# Patient Record
Sex: FEMALE | URBAN - METROPOLITAN AREA
[De-identification: names, ages, dates, MRNs, and addresses within clinical notes are randomized per-mention and may not be internally consistent; named-entity substitution may affect disease eponyms.]

---

## 2022-02-18 ENCOUNTER — APPOINTMENT (RX ONLY)
Dept: URBAN - METROPOLITAN AREA CLINIC 335 | Facility: CLINIC | Age: 52
Setting detail: DERMATOLOGY
End: 2022-02-18

## 2022-02-18 DIAGNOSIS — L64.8 OTHER ANDROGENIC ALOPECIA: ICD-10-CM | Status: INADEQUATELY CONTROLLED

## 2022-02-18 DIAGNOSIS — L98.8 OTHER SPECIFIED DISORDERS OF THE SKIN AND SUBCUTANEOUS TISSUE: ICD-10-CM

## 2022-02-18 DIAGNOSIS — L71.8 OTHER ROSACEA: ICD-10-CM | Status: INADEQUATELY CONTROLLED

## 2022-02-18 PROCEDURE — ? PRESCRIPTION

## 2022-02-18 PROCEDURE — ? COUNSELING

## 2022-02-18 PROCEDURE — ? BOTOX

## 2022-02-18 PROCEDURE — ? ORDER TESTS

## 2022-02-18 PROCEDURE — ? ADDITIONAL NOTES

## 2022-02-18 PROCEDURE — 99204 OFFICE O/P NEW MOD 45 MIN: CPT

## 2022-02-18 PROCEDURE — ? OBSERVATION

## 2022-02-18 RX ORDER — HYDROCORTISONE 25 MG/G
CREAM TOPICAL BID
Qty: 30 | Refills: 3 | Status: ERX | COMMUNITY
Start: 2022-02-18

## 2022-02-18 RX ORDER — METRONIDAZOLE 7.5 MG/G
CREAM TOPICAL BID
Qty: 45 | Refills: 3 | Status: ERX | COMMUNITY
Start: 2022-02-18

## 2022-02-18 RX ADMIN — HYDROCORTISONE: 25 CREAM TOPICAL at 00:00

## 2022-02-18 RX ADMIN — METRONIDAZOLE: 7.5 CREAM TOPICAL at 00:00

## 2022-02-18 ASSESSMENT — LOCATION DETAILED DESCRIPTION DERM
LOCATION DETAILED: LEFT MEDIAL FRONTAL SCALP
LOCATION DETAILED: LEFT CENTRAL ZYGOMA
LOCATION DETAILED: SUPERIOR MID FOREHEAD
LOCATION DETAILED: RIGHT INFERIOR CENTRAL MALAR CHEEK

## 2022-02-18 ASSESSMENT — LOCATION SIMPLE DESCRIPTION DERM
LOCATION SIMPLE: SUPERIOR FOREHEAD
LOCATION SIMPLE: RIGHT CHEEK
LOCATION SIMPLE: LEFT SCALP
LOCATION SIMPLE: LEFT ZYGOMA

## 2022-02-18 ASSESSMENT — LOCATION ZONE DERM
LOCATION ZONE: FACE
LOCATION ZONE: SCALP

## 2022-02-18 NOTE — HPI: HAIR LOSS
How Did The Hair Loss Occur?: gradual in onset
Additional History: Patient also reports rosacea on face that is not currently managed.

## 2022-02-18 NOTE — PROCEDURE: BOTOX
Price (Use Numbers Only, No Special Characters Or $): 812 Price (Use Numbers Only, No Special Characters Or $): 767

## 2022-02-18 NOTE — PROCEDURE: ORDER TESTS
Expected Date Of Service: 02/18/2022
Bill For Surgical Tray: no
Billing Type: Third-Party Bill
Performing Laboratory: 0

## 2022-03-09 ENCOUNTER — APPOINTMENT (RX ONLY)
Dept: URBAN - METROPOLITAN AREA CLINIC 328 | Facility: CLINIC | Age: 52
Setting detail: DERMATOLOGY
End: 2022-03-09

## 2022-03-09 DIAGNOSIS — L64.8 OTHER ANDROGENIC ALOPECIA: ICD-10-CM

## 2022-03-09 DIAGNOSIS — Z41.9 ENCOUNTER FOR PROCEDURE FOR PURPOSES OTHER THAN REMEDYING HEALTH STATE, UNSPECIFIED: ICD-10-CM

## 2022-03-09 PROCEDURE — ? IN-HOUSE DISPENSING PHARMACY

## 2022-03-09 PROCEDURE — ? OBSERVATION

## 2022-03-09 PROCEDURE — ? COUNSELING

## 2022-03-09 PROCEDURE — ? BOTOX

## 2022-03-09 PROCEDURE — 99213 OFFICE O/P EST LOW 20 MIN: CPT

## 2022-03-09 ASSESSMENT — LOCATION ZONE DERM
LOCATION ZONE: FACE
LOCATION ZONE: SCALP

## 2022-03-09 ASSESSMENT — LOCATION DETAILED DESCRIPTION DERM
LOCATION DETAILED: INFERIOR MID FOREHEAD
LOCATION DETAILED: RIGHT INFERIOR TEMPLE
LOCATION DETAILED: LEFT MEDIAL FRONTAL SCALP

## 2022-03-09 ASSESSMENT — LOCATION SIMPLE DESCRIPTION DERM
LOCATION SIMPLE: INFERIOR FOREHEAD
LOCATION SIMPLE: RIGHT TEMPLE
LOCATION SIMPLE: LEFT SCALP

## 2022-03-09 NOTE — PROCEDURE: IN-HOUSE DISPENSING PHARMACY
Product 15 Unit Type: bottle(s)
Product 71 Refills: 0
Product 52 Unit Type: mg
Product 9 Amount/Unit (Numbers Only): 1
Name Of Product 14: #14 Rosacea Silicone Gel
Product 18 Price/Unit (In Dollars): 10
Product 5 Price/Unit (In Dollars): 45
Product 22 Amount/Unit (Numbers Only): 18
Name Of Product 3: #3 Acne Gel Combo
Product 20 Application Directions: Take one tablet daily.
Name Of Product 16: #16 Dermatitis Topical Solution
Product 7 Application Directions: Apply to the nails daily. Please do not apply socks for about5 hours (best if applied at night) Remove once weekly then start again.
Product 20 Unit Type: tablets
Product 12 Price/Unit (In Dollars): 60
Product 10 Price/Unit (In Dollars): 35
Product 14 Application Directions: Apply to the face once daily AM or PM
Name Of Product 21: #21 Minocycline
Product 3 Application Directions: Apply to the face once daily AM or PM or as directed by provider.
Product 1 Price/Unit (In Dollars): 55
Name Of Product 8: #8 Anti Fungal Cream
Product 18 Amount/Unit (Numbers Only): 20
Product 16 Application Directions: Apply to the affected area 1-2 times daily or as directed by provider.
Name Of Product 13: #13 Rosacea Cream
Product 6 Application Directions: Apply to the scalp three times weekly
Name Of Product 24: #24 Spironolactone
Name Of Product 11: #11 Antibacterial Ointment
Name Of Product 2: #2 Acne Moisturizing Cream
Name Of Product 15: #15 Anti Fungal Shampoo
Product 19 Application Directions: Take one tablet twice daily with food or as directed by provider.
Product 19 Unit Type: capsules
Product 13 Application Directions: Apply to the face twice daily or as directed by provider.
Name Of Product 20: #20 Loratadine
Product 2 Application Directions: Apply to the face at bedtime. Start every other night then increase usage depending on tolerance.
Name Of Product 7: #7 Anti Fungal Nail Solution
Product 24 Application Directions: Take one tablet twice daily or as directed by provider.
Product 15 Application Directions: Apply to the scalp 2-3 times weekly. Can be applied every other shampoo as well.
Render Product Pricing In Note: No
Name Of Product 18: #18 Bactrim
Name Of Product 5: #5 Actinic Keratosis (Gel)
Product 22 Application Directions: Use as directed by provider.
Product 9 Application Directions: Apply to the affected area three times weekly.
Render Refills If Set To 0: Yes
Name Of Product 12: #12 Melasma Emulsion
Product 5 Application Directions: For Warts/MC- apply to the areas 2-3 times weekly\\nFor (Pre)Skin Cancer- apply nightly for 2-6 weeks or as directed by provider.
Name Of Product 23: #23 Prednisone 50mg
Name Of Product 10: #10 Fungal Dermatitis Cream
Product 20 Amount/Unit (Numbers Only): 30
Product 3 Price/Unit (In Dollars): 40
Name Of Product 1: #1 Acne Gel
Product 12 Application Directions: Apply to the face three times weekly at night for two months only. Take a two month break and then continue for two months again. (On your office months, you can apply Lytera daily)
Product 1 Application Directions: Apply to the face twice daily (if another rx is being prescribed it will be applied in the AM)
Name Of Product 19: #19 Doxycycline
Name Of Product 6: #6 Alopecia Topical Solution
Product 10 Application Directions: Apply to the affected area twice daily for 10 days.
Product 23 Amount/Unit (Numbers Only): 5
Product 6 Refills: 3
Name Of Product 17: #17 Xerosis Gel
Name Of Product 4: #4 Acne Gel
Product 21 Application Directions: Take one tablet twice daily with food.
Product 8 Application Directions: Apply to the affected area twice daily.
Name Of Product 22: #22 Prednisone 20mg
Product 4 Application Directions: Apply to the face at bedtime or as directed by provider.
Name Of Product 9: #9 Dermatitis Cream
Product 2 Price/Unit (In Dollars): 50
Product 17 Application Directions: Apply to affected area nightly.
Detail Level: Zone
Product 11 Application Directions: Apply twice daily to wound.
Product 20 Price/Unit (In Dollars): 15

## 2022-07-09 ENCOUNTER — TELEPHONE ENCOUNTER (OUTPATIENT)
Dept: URBAN - METROPOLITAN AREA CLINIC 121 | Facility: CLINIC | Age: 52
End: 2022-07-09

## 2022-07-09 RX ORDER — OMEPRAZOLE 40 MG/1
ONCE A DAY CAPSULE, DELAYED RELEASE ORAL ONCE A DAY
Refills: 0 | OUTPATIENT
Start: 2019-03-04 | End: 2019-06-06

## 2022-07-10 ENCOUNTER — TELEPHONE ENCOUNTER (OUTPATIENT)
Dept: URBAN - METROPOLITAN AREA CLINIC 121 | Facility: CLINIC | Age: 52
End: 2022-07-10

## 2022-07-10 RX ORDER — FAMOTIDINE 20 MG/1
TABLET ORAL AS NEEDED
Refills: 0 | Status: ACTIVE | COMMUNITY
Start: 2019-03-04

## 2022-07-10 RX ORDER — ONDANSETRON HYDROCHLORIDE 4 MG/1
USE AS DIRECTED TAKE 1 TABLET 30 MINUTES PRIOR TO EACH HALF OF COLONOSCOPY PREP TO PREVENT NAUSEA TABLET, FILM COATED ORAL
Refills: 0 | Status: ACTIVE | COMMUNITY
Start: 2019-03-04

## 2022-07-10 RX ORDER — OMEPRAZOLE 40 MG/1
TAKE ONE CAPSULE BY MOUTH ONE TIME DAILY CAPSULE, DELAYED RELEASE ORAL
Refills: 0 | Status: ACTIVE | COMMUNITY
Start: 2019-06-06

## 2024-03-29 ENCOUNTER — NEW PATIENT (OUTPATIENT)
Dept: URBAN - METROPOLITAN AREA CLINIC 26 | Facility: CLINIC | Age: 54
End: 2024-03-29

## 2024-03-29 VITALS
HEART RATE: 78 BPM | SYSTOLIC BLOOD PRESSURE: 107 MMHG | BODY MASS INDEX: 22.02 KG/M2 | WEIGHT: 129 LBS | DIASTOLIC BLOOD PRESSURE: 77 MMHG | HEIGHT: 64 IN

## 2024-03-29 DIAGNOSIS — H04.123: ICD-10-CM

## 2024-03-29 DIAGNOSIS — M35.00: ICD-10-CM

## 2024-03-29 DIAGNOSIS — H02.834: ICD-10-CM

## 2024-03-29 DIAGNOSIS — D49.9: ICD-10-CM

## 2024-03-29 DIAGNOSIS — Z79.899: ICD-10-CM

## 2024-03-29 DIAGNOSIS — H25.13: ICD-10-CM

## 2024-03-29 DIAGNOSIS — H02.831: ICD-10-CM

## 2024-03-29 PROCEDURE — 99204 OFFICE O/P NEW MOD 45 MIN: CPT

## 2024-03-29 PROCEDURE — 92250 FUNDUS PHOTOGRAPHY W/I&R: CPT

## 2024-03-29 PROCEDURE — 92134 CPTRZ OPH DX IMG PST SGM RTA: CPT

## 2024-03-29 PROCEDURE — 92083 EXTENDED VISUAL FIELD XM: CPT

## 2024-03-29 ASSESSMENT — TONOMETRY
OS_IOP_MMHG: 17
OD_IOP_MMHG: 18

## 2024-03-29 ASSESSMENT — VISUAL ACUITY
OD_SC: 20/40+1
OS_CC: 20/20
OS_SC: 20/50+2
OD_CC: 20/25+2

## 2024-11-26 ENCOUNTER — APPOINTMENT (RX ONLY)
Dept: URBAN - METROPOLITAN AREA CLINIC 335 | Facility: CLINIC | Age: 54
Setting detail: DERMATOLOGY
End: 2024-11-26

## 2024-11-26 DIAGNOSIS — Z41.9 ENCOUNTER FOR PROCEDURE FOR PURPOSES OTHER THAN REMEDYING HEALTH STATE, UNSPECIFIED: ICD-10-CM

## 2024-11-26 PROCEDURE — ? PRODUCT LINE (REVISION)

## 2024-11-26 PROCEDURE — ? EXPRESS HYDRAFACIAL

## 2024-11-26 NOTE — HPI: COSMETIC CONSULTATION
When Outside In The Sun, Do You...: always burns, never tans
Additional History: Currently not using skin care (dove bar soap some times) Cerave gentle cleanser SY SPF \\n\\n\\nSkin usually feels normal \\n

## 2024-11-26 NOTE — PROCEDURE: PRODUCT LINE (REVISION)
Product 13 Units: 0
Product 41 Price (In Dollars - Numeric Only, No Special Characters Or $): 0.00
Product 2 Application Directions: Wet face with tepid water. Dispense a dime-sized amount into palm of hand. Lather in hands. Using fingertips, gently massage onto face using circular motions. Avoid eye area. Rinse thoroughly and pat skin dry. Can be used once or twice daily as tolerated.
Assigning Risk Information: Per AMA, level of risk is based upon consequences of the problem(s) addressed at the encounter when appropriately treated. Risk also includes medical decision making related to the need to initiate or forego further testing, treatment and/or hospitalization. Over the counter medication are assigned a risk level of low. Prescription medication management is assigned a risk level of moderate.
Product 13 Application Directions: Use only at night. After cleansing, dispense one to two pumps on back of hand. Apply to face two to three times per week, avoiding the eye area. Increase usage as tolerated
Risk Of Complication Category: No MDM
Product 16 Price (In Dollars - Numeric Only, No Special Characters Or $): 38.00
Product 8 Price (In Dollars - Numeric Only, No Special Characters Or $): 100
Name Of Product 11: Pumpkin Enzyme Mask
Allow Plan To Count Towards E/M Coding: Yes
Product 8 Units: 1
Name Of Product 3: C+ Brightening Eye Complex
Product 5 Application Directions: Gently dispense up to one pump and dot around eye area. Massage product onto skin using cooling metal applicator. Tap in excess product with finger. Avoid direct eye contact. Use twice daily
Product 8 Application Directions: Apply to clean skin. Dispense two pumps into palm of hand and gently apply to face. Avoid eye area. Use twice daily. Can be used alone or as a moisture booster under creams or lotions.
Product 3 Price (In Dollars - Numeric Only, No Special Characters Or $): 120
Product 11 Price (In Dollars - Numeric Only, No Special Characters Or $): 52.00
Name Of Product 14: Revox Line Relaxer
Name Of Product 6: DEJ Boosting Serum
Product 16 Application Directions: Glide onto lips in a massaging motion. Use three times daily for optimal results or as needed. Use alone or layered over your favorite lip color.
Name Of Product 17: Gentle foaming cleanser
Product 11 Application Directions: Using fingertips, apply a generous amount onto dry skin, avoiding eye area. Lightly scrub in the mask using circular motions and moving outward to stimulate gentle exfoliation. Leave on for 15-20 minutes. Remove with warm water and if needed, a washcloth. Pat skin dry. Use 1-3 times per week.
Product 6 Price (In Dollars - Numeric Only, No Special Characters Or $): 225.00
Name Of Product 9: Intellishade TruPhysical
Product 14 Price (In Dollars - Numeric Only, No Special Characters Or $): 151.00
Product 3 Application Directions: Dispense a small amount onto cooling metal applicator and massage outward in a circular motion onto under-eye area only. Tap in excess product with finger. Avoid direct eye contact. Use twice daily.
Name Of Product 12: Retinol Complete 0.5
Product 14 Application Directions: Apply to clean skin prior to moisturizer. Dispense desired amount and massage serum onto any fine lines and wrinkles. Avoid direct eye contact. Use twice daily. NOTE: A thin layer of product is all that is needed
Product 9 Price (In Dollars - Numeric Only, No Special Characters Or $): 80.00
Product 17 Price (In Dollars - Numeric Only, No Special Characters Or $): 50
Name Of Product 4: C+ Correcting Complex 30%
Product 6 Application Directions: Used twice daily on the full face, including the total eye area.
Product 1 Price (In Dollars - Numeric Only, No Special Characters Or $): 157.50
Product 9 Application Directions: Apply evenly to face as the last step in your morning skincare routine. Wear alone or under makeup. Apply liberally 15 minutes prior to sun exposure (See Drug Fact(s) Panels on cartons.). Can be used with Vitamin C Lotion 15% or 30% for added antioxidant benefits
Product 4 Price (In Dollars - Numeric Only, No Special Characters Or $): 185
Name Of Product 15: Soothing Facial Rinse
Name Of Product 7: Gentle Cleansing Lotion
Detail Level: Zone
Product 12 Price (In Dollars - Numeric Only, No Special Characters Or $): 104.00
Name Of Product 1: Simonirm
Product 1 Application Directions: Using a circular motion, massage into skin until fully absorbed. Apply twice daily. To see optimal results, it is recommended to exfoliate twice a week using a loofah or physical exfoliator.
Product 17 Application Directions: Use one pump up to twice daily, work into skin and rinse thoroughly
Name Of Product 18: Papaya enzyme cleanser
Product 12 Application Directions: Use only at night. After cleansing, dispense one to two pumps on back of hand. Apply to face two to three times per week, avoiding the eye area. Increase usage as tolerated.
Product 15 Price (In Dollars - Numeric Only, No Special Characters Or $): 40.00
Name Of Product 10: Nectifirm
Name Of Product 2: Brightening face wash
Product 4 Application Directions: Use morning and evening after cleansing, but before moisturizing. Dispense one pump into palm of hand and apply evenly to the face, avoiding the eye area. Be sure to follow with SPF
Name Of Product 5: DEJ Eye Cream
Product 18 Price (In Dollars - Numeric Only, No Special Characters Or $): 44
Product 10 Price (In Dollars - Numeric Only, No Special Characters Or $): 98.00
Name Of Product 13: Retinol Complete 1.0
Product 15 Application Directions: After cleansing, saturate a cotton pad with toner. Gently swipe across face. Avoid eye area. Use twice daily
Product 7 Application Directions: Wet face with tepid water. Dispense a quarter-sized amount into palm of hand. Using fingertips, gently massage onto face using circular motions. Rinse thoroughly and pat skin dry. Use twice daily, morning and evening
Product 10 Application Directions: Dispense one or more pumps and gently apply onto décolletage. Using upward strokes, work your way up to the neck and jawline. Use twice daily.
Product 5 Price (In Dollars - Numeric Only, No Special Characters Or $): 114.00
Name Of Product 16: YouthFull Lip Replenisher
Name Of Product 8: Hydrating Serum
Product 13 Price (In Dollars - Numeric Only, No Special Characters Or $): 127.00

## 2024-11-26 NOTE — PROCEDURE: EXPRESS HYDRAFACIAL
Tip: Hydropeel Tip, Clear
Vacuum Pressure High Setting (Will Not Render If Set To 0): 0
Vacuum Pressure High Setting (Will Not Render If Set To 0): 15
Solution: Beta-HD
Treatment Number: 1
Solution: Antiox-6
Indication: acne
Procedure: Fusion
Tip Override
Solution: GlySal 15%
Number Of Passes: 2
Tip: Hydropeel Tip, Blue
Solution Override
Procedure: Exfoliation
Consent: Written consent obtained, risks reviewed including but not limited to crusting, scabbing, blistering, scarring, darker or lighter pigmentary change, bruising, and/or incomplete response.
Location: face
Solution: Activ-4
Tip: Hydropeel Tip, Teal
Procedure: Peel
Post-Care Instructions: I reviewed with the patient in detail post-care instructions. Patient should stay away from the sun and wear sun protection until treated areas are fully healed.
Procedure: Extraction
Price (Use Numbers Only, No Special Characters Or $): 199

## 2025-05-13 ENCOUNTER — APPOINTMENT (OUTPATIENT)
Dept: URBAN - METROPOLITAN AREA CLINIC 335 | Facility: CLINIC | Age: 55
Setting detail: DERMATOLOGY
End: 2025-05-13

## 2025-05-13 DIAGNOSIS — L57.0 ACTINIC KERATOSIS: ICD-10-CM | Status: INADEQUATELY CONTROLLED

## 2025-05-13 PROCEDURE — 17000 DESTRUCT PREMALG LESION: CPT

## 2025-05-13 PROCEDURE — ? LIQUID NITROGEN

## 2025-05-13 ASSESSMENT — LOCATION ZONE DERM: LOCATION ZONE: FACE

## 2025-05-13 ASSESSMENT — LOCATION SIMPLE DESCRIPTION DERM: LOCATION SIMPLE: LEFT CHEEK

## 2025-05-13 ASSESSMENT — LOCATION DETAILED DESCRIPTION DERM: LOCATION DETAILED: LEFT INFERIOR CENTRAL MALAR CHEEK

## 2025-05-13 NOTE — PROCEDURE: LIQUID NITROGEN
Post-Care Instructions: I reviewed with the patient in detail post-care instructions. Patient is to wear sunprotection, and avoid picking at any of the treated lesions. Pt may apply Vaseline to crusted or scabbing areas.
Render Post-Care Instructions In Note?: no
Number Of Freeze-Thaw Cycles: 1 freeze-thaw cycle
Detail Level: Detailed
Show Applicator Variable?: Yes
Duration Of Freeze Thaw-Cycle (Seconds): 2
Application Tool (Optional): Liquid Nitrogen Sprayer
Consent: The patient's consent was obtained including but not limited to risks of crusting, scabbing, blistering, scarring, darker or lighter pigmentary change, recurrence, incomplete removal and infection.

## 2025-07-30 ENCOUNTER — TELEPHONE ENCOUNTER (OUTPATIENT)
Dept: URBAN - METROPOLITAN AREA CLINIC 63 | Facility: CLINIC | Age: 55
End: 2025-07-30

## 2025-08-04 ENCOUNTER — DASHBOARD ENCOUNTERS (OUTPATIENT)
Age: 55
End: 2025-08-04

## 2025-08-04 ENCOUNTER — OFFICE VISIT (OUTPATIENT)
Dept: URBAN - METROPOLITAN AREA CLINIC 9 | Facility: CLINIC | Age: 55
End: 2025-08-04
Payer: COMMERCIAL

## 2025-08-04 DIAGNOSIS — K90.0 CELIAC DISEASE: ICD-10-CM

## 2025-08-04 DIAGNOSIS — R68.81 EARLY SATIETY: ICD-10-CM

## 2025-08-04 DIAGNOSIS — R19.4 CHANGE IN BOWEL HABITS: ICD-10-CM

## 2025-08-04 DIAGNOSIS — R10.13 DYSPEPSIA: ICD-10-CM

## 2025-08-04 PROCEDURE — 99204 OFFICE O/P NEW MOD 45 MIN: CPT | Performed by: INTERNAL MEDICINE

## 2025-08-04 RX ORDER — ONDANSETRON HYDROCHLORIDE 4 MG/1
USE AS DIRECTED TAKE 1 TABLET 30 MINUTES PRIOR TO EACH HALF OF COLONOSCOPY PREP TO PREVENT NAUSEA TABLET, FILM COATED ORAL
Refills: 0 | Status: ON HOLD | COMMUNITY
Start: 2019-03-04

## 2025-08-04 RX ORDER — MELOXICAM 15 MG/1
1 TABLET TABLET ORAL ONCE A DAY
Status: ACTIVE | COMMUNITY

## 2025-08-04 RX ORDER — OMEPRAZOLE 40 MG/1
TAKE ONE CAPSULE BY MOUTH ONE TIME DAILY CAPSULE, DELAYED RELEASE ORAL
Refills: 0 | Status: ON HOLD | COMMUNITY
Start: 2019-06-06

## 2025-08-04 RX ORDER — FAMOTIDINE 20 MG/1
TABLET ORAL AS NEEDED
Refills: 0 | Status: ACTIVE | COMMUNITY
Start: 2019-03-04

## 2025-08-04 RX ORDER — FLUTICASONE PROPIONATE 50 UG/1
1 SPRAY IN EACH NOSTRIL SPRAY, METERED NASAL TWICE A DAY
Status: ACTIVE | COMMUNITY

## 2025-08-12 ENCOUNTER — OFFICE VISIT (OUTPATIENT)
Dept: URBAN - METROPOLITAN AREA SURGERY CENTER 9 | Facility: SURGERY CENTER | Age: 55
End: 2025-08-12